# Patient Record
Sex: MALE | Race: BLACK OR AFRICAN AMERICAN
[De-identification: names, ages, dates, MRNs, and addresses within clinical notes are randomized per-mention and may not be internally consistent; named-entity substitution may affect disease eponyms.]

---

## 2023-06-15 ENCOUNTER — RX ONLY (RX ONLY)
Age: 68
End: 2023-06-15

## 2023-06-15 ENCOUNTER — DOCTOR'S OFFICE (OUTPATIENT)
Dept: URBAN - METROPOLITAN AREA CLINIC 125 | Facility: CLINIC | Age: 68
Setting detail: OPHTHALMOLOGY
End: 2023-06-15
Payer: MEDICAID

## 2023-06-15 DIAGNOSIS — H04.123: ICD-10-CM

## 2023-06-15 DIAGNOSIS — H35.363: ICD-10-CM

## 2023-06-15 DIAGNOSIS — H35.373: ICD-10-CM

## 2023-06-15 DIAGNOSIS — H25.043: ICD-10-CM

## 2023-06-15 DIAGNOSIS — H25.013: ICD-10-CM

## 2023-06-15 DIAGNOSIS — E11.9: ICD-10-CM

## 2023-06-15 DIAGNOSIS — H11.153: ICD-10-CM

## 2023-06-15 DIAGNOSIS — H25.13: ICD-10-CM

## 2023-06-15 PROCEDURE — 92004 COMPRE OPH EXAM NEW PT 1/>: CPT | Performed by: OPHTHALMOLOGY

## 2023-06-15 PROCEDURE — 92134 CPTRZ OPH DX IMG PST SGM RTA: CPT | Performed by: OPHTHALMOLOGY

## 2023-06-15 ASSESSMENT — SPHEQUIV_DERIVED
OD_SPHEQUIV: 1.25
OS_SPHEQUIV: 2.5

## 2023-06-15 ASSESSMENT — REFRACTION_AUTOREFRACTION
OS_CYLINDER: -1.50
OS_AXIS: 113
OS_SPHERE: +3.25
OD_CYLINDER: -1.00
OD_SPHERE: +1.75
OD_AXIS: 040

## 2023-06-15 ASSESSMENT — KERATOMETRY
OD_K1POWER_DIOPTERS: 45.00
OS_K1POWER_DIOPTERS: 44.25
OS_K2POWER_DIOPTERS: 45.25
OS_AXISANGLE_DEGREES: 142
OD_K2POWER_DIOPTERS: 46.25
OD_AXISANGLE_DEGREES: 029

## 2023-06-15 ASSESSMENT — REFRACTION_CURRENTRX
OS_SPHERE: +2.75
OD_ADD: +2.25
OS_CYLINDER: -1.50
OS_ADD: +2.25
OD_SPHERE: +2.25
OD_CYLINDER: -1.75
OD_VPRISM_DIRECTION: PROGS
OS_VPRISM_DIRECTION: PROGS
OS_OVR_VA: 20/
OD_OVR_VA: 20/
OS_AXIS: 134
OD_AXIS: 041

## 2023-06-15 ASSESSMENT — DRY EYES - PHYSICIAN NOTES
OD_GENERALCOMMENTS: TRACE PEE
OS_GENERALCOMMENTS: TRACE PEE

## 2023-06-15 ASSESSMENT — AXIALLENGTH_DERIVED
OD_AL: 22.389
OS_AL: 22.2408

## 2023-06-15 ASSESSMENT — CONFRONTATIONAL VISUAL FIELD TEST (CVF)
OD_FINDINGS: FULL
OS_FINDINGS: FULL

## 2023-06-15 ASSESSMENT — VISUAL ACUITY
OS_BCVA: 20/30-1
OD_BCVA: 20/25-2

## 2024-09-03 ENCOUNTER — TELEPHONE (OUTPATIENT)
Age: 69
End: 2024-09-03

## 2024-09-11 ENCOUNTER — OFFICE VISIT (OUTPATIENT)
Dept: OBGYN CLINIC | Facility: MEDICAL CENTER | Age: 69
End: 2024-09-11
Payer: MEDICARE

## 2024-09-11 ENCOUNTER — APPOINTMENT (OUTPATIENT)
Dept: RADIOLOGY | Facility: MEDICAL CENTER | Age: 69
End: 2024-09-11
Payer: MEDICARE

## 2024-09-11 VITALS — DIASTOLIC BLOOD PRESSURE: 81 MMHG | HEART RATE: 64 BPM | SYSTOLIC BLOOD PRESSURE: 134 MMHG

## 2024-09-11 DIAGNOSIS — M54.12 RADICULOPATHY, CERVICAL REGION: ICD-10-CM

## 2024-09-11 DIAGNOSIS — M25.512 LEFT SHOULDER PAIN, UNSPECIFIED CHRONICITY: Primary | ICD-10-CM

## 2024-09-11 DIAGNOSIS — S42.292A OTHER CLOSED DISPLACED FRACTURE OF PROXIMAL END OF LEFT HUMERUS, INITIAL ENCOUNTER: ICD-10-CM

## 2024-09-11 DIAGNOSIS — M25.512 LEFT SHOULDER PAIN, UNSPECIFIED CHRONICITY: ICD-10-CM

## 2024-09-11 PROCEDURE — 73030 X-RAY EXAM OF SHOULDER: CPT

## 2024-09-11 PROCEDURE — 99203 OFFICE O/P NEW LOW 30 MIN: CPT | Performed by: ORTHOPAEDIC SURGERY

## 2024-09-11 RX ORDER — ASPIRIN 81 MG/1
TABLET ORAL
COMMUNITY

## 2024-09-11 RX ORDER — SACUBITRIL AND VALSARTAN 24; 26 MG/1; MG/1
TABLET, FILM COATED ORAL
COMMUNITY
Start: 2024-08-23

## 2024-09-11 RX ORDER — MAGNESIUM OXIDE 400 MG/1
1 TABLET ORAL DAILY
COMMUNITY
Start: 2024-08-31

## 2024-09-11 RX ORDER — APIXABAN 5 MG/1
TABLET, FILM COATED ORAL
COMMUNITY

## 2024-09-11 RX ORDER — DULAGLUTIDE 3 MG/.5ML
INJECTION, SOLUTION SUBCUTANEOUS
COMMUNITY
Start: 2024-08-29

## 2024-09-11 RX ORDER — FLUTICASONE PROPIONATE 50 MCG
SPRAY, SUSPENSION (ML) NASAL
COMMUNITY
Start: 2024-07-02

## 2024-09-11 RX ORDER — CARVEDILOL 12.5 MG/1
12.5 TABLET ORAL 2 TIMES DAILY
COMMUNITY
Start: 2024-07-02

## 2024-09-11 RX ORDER — ERGOCALCIFEROL 1.25 MG/1
50000 CAPSULE, LIQUID FILLED ORAL WEEKLY
COMMUNITY
Start: 2024-07-19

## 2024-09-11 RX ORDER — FUROSEMIDE 80 MG
80 TABLET ORAL DAILY
COMMUNITY
Start: 2024-08-12

## 2024-09-11 RX ORDER — ATORVASTATIN CALCIUM 40 MG/1
TABLET, FILM COATED ORAL
COMMUNITY
Start: 2024-09-10

## 2024-09-11 NOTE — PROGRESS NOTES
Ortho Sports Medicine Shoulder New Patient Visit     Assesment:   69 y.o. male left shoulder proximal humerus fracture 6 weeks out from injury with no physical therapy done and stiffness and malunion DOI 07/29/2024.    Plan:    Conservative treatment:    Ice to shoulder 1-2 times daily, for 20 minutes at a time.  Referral to Dr. Pearl to discuss surgical (RTSA) vs non-surgical treatment with possible manipulation if he remains stiff.  Patient demonstrates shoulder stiffness on exam today.     Imaging:    All imaging from today was reviewed by myself and explained to the patient, including prior xrays, CT, and new xrays obtained during today's visit.      Injection:    No Injection planned at this time.      Surgery:     No surgery is recommended at this point, continue with conservative treatment plan as noted.      Follow up:    No follow-ups on file.        Chief Complaint   Patient presents with    Left Shoulder - Pain       History of Present Illness:    The patient is a 69 y.o., right hand dominant male whose occupation is retired, referred to me by themself, seen in clinic for evaluation and second opinion of right shoulder proximal humerus fracture.. Patient presents with deformity of the shoulder while seated.    The patient has a history of diabetes.  The patient denies a history of thyroid disorder.      Pain is located superior and lateral.  The patient rates the pain as a 3/10.  The pain has been present for 6 weeks.      The patient sustained an injury on 07/29/2024.  The mechanism of injury was a fall onto the lateral aspect of his shoulder. Patient reports he presented to the ED at that time and followed-up with a sports medicine physician Dr. Baltazar Esquivel who followed him with xrays every 2 weeks and recommended a course of PT. PT refused to treat his shoulders due to concern of injuries and referred him back to orthopedics. He reports he has been trying to do exercises on his own.    The pain is  characterized as dull, achy.  The pain is present daily.      The patient has weakness. Patient is unable to make a fist.  The patient has numbness and tingling. Patient has edema into left hand.         Shoulder Surgical History:  None    Past Medical, Social and Family History:  No past medical history on file.  No past surgical history on file.  Allergies   Allergen Reactions    Penicillins Other (See Comments)     Other       No current outpatient medications on file prior to visit.     No current facility-administered medications on file prior to visit.     Social History     Socioeconomic History    Marital status:      Spouse name: Not on file    Number of children: Not on file    Years of education: Not on file    Highest education level: Not on file   Occupational History    Not on file   Tobacco Use    Smoking status: Not on file    Smokeless tobacco: Not on file   Substance and Sexual Activity    Alcohol use: Not on file    Drug use: Not on file    Sexual activity: Not on file   Other Topics Concern    Not on file   Social History Narrative    Not on file     Social Determinants of Health     Financial Resource Strain: Not on file   Food Insecurity: Not on file   Transportation Needs: Not on file   Physical Activity: Not on file   Stress: Not on file   Social Connections: Not on file   Intimate Partner Violence: Not on file   Housing Stability: Not on file         I have reviewed the past medical, surgical, social and family history, medications and allergies as documented in the EMR.    Review of systems: ROS is negative other than that noted in the HPI.  Constitutional: Negative for fatigue and fever.   HENT: Negative for sore throat.    Respiratory: Negative for shortness of breath.    Cardiovascular: Negative for chest pain.   Gastrointestinal: Negative for abdominal pain.   Endocrine: Negative for cold intolerance and heat intolerance.   Genitourinary: Negative for flank pain.    Musculoskeletal: Negative for back pain.   Skin: Negative for rash.   Allergic/Immunologic: Negative for immunocompromised state.   Neurological: Negative for dizziness.   Psychiatric/Behavioral: Negative for agitation.      Physical Exam:    There were no vitals taken for this visit.    General/Constitutional: NAD, well developed, well nourished  HENT: Normocephalic, atraumatic  CV: Intact distal pulses, regular rate  Resp: No respiratory distress or labored breathing  GI: Soft and non-tender   Lymphatic: No lymphadenopathy palpated  Neuro: Alert and Oriented x 3, no focal deficits  Psych: Normal mood, normal affect, normal judgement, normal behavior  Skin: Warm, dry, no rashes, no erythema      Shoulder focused exam:       RIGHT LEFT    Scapula Atrophy Negative Negative     Winging Negative Negative     Protraction Negative Negative    Rotator cuff SS 5/5 4/5     IS 5/5 5/5     SubS 5/5 5/5    ROM     70     ER0 60 30                         TTP: AC Negative Negative     Biceps Negative Negative     Coracoid Negative Negative    Special Tests: O'Briens Negative Positive     Tucker-shear Negative Positive     Cross body Adduction Negative Negative     Speeds  Negative Negative     Raffi's Negative Negative     Whipple Negative Negative       Neer Negative Negative     Erickson Negative Negative    Instability: Apprehension & relocation not tested not tested     Load & shift not tested not tested    Other: Crank Negative Negative                 UE NV Exam: +2 Radial pulses bilaterally  Sensation intact to light touch C5-T1 bilaterally, Radial/median/ulnar nerve motor intact      Bilateral elbow, wrist, and and forearm ROM full, painless with passive ROM, no ttp or crepitance throughout extremities below shoulder joint    Cervical ROM is full without pain, numbness or tingling      Shoulder Imaging    X-rays of the left shoulder were reviewed, which demonstrate healing malunion of left proximal humerus fracture  in nonanatomic position.  I have reviewed the radiology report and agree with their impression.

## 2024-09-23 ENCOUNTER — OFFICE VISIT (OUTPATIENT)
Dept: OBGYN CLINIC | Facility: MEDICAL CENTER | Age: 69
End: 2024-09-23
Payer: MEDICARE

## 2024-09-23 VITALS
SYSTOLIC BLOOD PRESSURE: 145 MMHG | BODY MASS INDEX: 42.66 KG/M2 | HEIGHT: 72 IN | HEART RATE: 58 BPM | WEIGHT: 315 LBS | DIASTOLIC BLOOD PRESSURE: 68 MMHG

## 2024-09-23 DIAGNOSIS — S42.292A OTHER CLOSED DISPLACED FRACTURE OF PROXIMAL END OF LEFT HUMERUS, INITIAL ENCOUNTER: ICD-10-CM

## 2024-09-23 DIAGNOSIS — M25.512 LEFT SHOULDER PAIN, UNSPECIFIED CHRONICITY: ICD-10-CM

## 2024-09-23 PROCEDURE — 99204 OFFICE O/P NEW MOD 45 MIN: CPT | Performed by: ORTHOPAEDIC SURGERY

## 2024-09-23 RX ORDER — TRAMADOL HYDROCHLORIDE 50 MG/1
50 TABLET ORAL
COMMUNITY
Start: 2024-07-29

## 2024-09-23 NOTE — PROGRESS NOTES
Orthopaedic Surgery - Office Note  Blayne Raphael (69 y.o. male)   : 1955   MRN: [id number]  Encounter Date: 2024    Assessment / Plan    Head-split left proximal humerus fracture with callus formation  Activity as tolerated  Begin outpatient PT for shoulder ROM and strengthening  Anti-inflammatories or Tylenol prn pain  XR AT NEXT VISIT:  left shoulder, trauma views  Continue with outpatient PT for 6 weeks before re-evaluation  Complete 3-4 months of outpatient PT before considering a shoulder replacement   Discussed the option of surgical intervention vs conservative treatment   Reviewed imaging on file  Follow-up:  Return in about 6 weeks (around 2024).      Chief Complaint / Date of Onset  Left shoulder pain  Injury Mechanism / Date  Fell directly onto his left shoulder after tripping over a door mat on 24  Surgery / Date  None    History of Present Illness   Blayne Raphael is a 69 y.o. male who presents for consultation for the above listed injury at the request of Ty Delacruz DO. He reports a decrease in overall shoulder pain, but still reports stiffness in all directions. He rates his current pain as a 4/10. He reports being out on his own for the first time this week and was unable to reach his arm out of the car window, and couldn't lift his arm to hit the turn signal. He reports numbness in digits 2-5 at all times and lacks  strength.     Treatment Summary  Medications / Modalities  OTC NSAIDS  Bracing / Immobilization  None  Physical Therapy  Will begin outpatient PT for shoulder ROM and strengthening  Injections  None  Prior Surgeries  None  Other Treatments  None    Employment / Current Status  Unknown    Sport / Organization / Current Status  Active      Review of Systems  Pertinent items are noted in HPI.  All other systems were reviewed and are negative.      Physical Exam  /68   Pulse 58   Ht 6' (1.829 m)   Wt (!) 179 kg (394 lb)   BMI 53.44 kg/m²   Cons: Appears  well.  No apparent distress.  Psych: Alert. Oriented x3.  Mood and affect normal.  Eyes: PERRLA, EOMI  Resp: Normal effort.  No audible wheezing or stridor.  CV: Palpable pulse.  No discernable arrhythmia.    Lymph:  No palpable cervical, axillary, or inguinal lymphadenopathy.  Skin: Warm.  No palpable masses.  No visible lesions.  Neuro: Normal muscle tone.  Normal and symmetric DTR's.       Left Shoulder Exam  Alignment / Posture:  Normal shoulder posture.  Inspection:  No swelling. No erythema. No ecchymosis.  Palpation:   Mild tenderness at bicipital groove.  ROM:  Shoulder FE 50. Shoulder ER 0. Shoulder IR hip.  Strength:  Not tested.  Stability:  Not tested.  Tests: No pertinent positive or negative tests.  Neurovascular:  Sensation intact in Ax/R/M/U nerve distributions. Palpable radial pulse.     Studies Reviewed  XR of left shoulder - Interval healing of proximal humeral fracture      Procedures  No procedures today.    Medical, Surgical, Family, and Social History  The patient's medical history, family history, and social history, were reviewed and updated as appropriate.    No past medical history on file.    No past surgical history on file.    No family history on file.    Social History     Occupational History    Not on file   Tobacco Use    Smoking status: Never    Smokeless tobacco: Never   Vaping Use    Vaping status: Never Used   Substance and Sexual Activity    Alcohol use: Never    Drug use: Never    Sexual activity: Not on file       Allergies   Allergen Reactions    Penicillins Other (See Comments)     Other           Current Outpatient Medications:     aspirin (ECOTRIN LOW STRENGTH) 81 mg EC tablet, Take by mouth, Disp: , Rfl:     atorvastatin (LIPITOR) 40 mg tablet, , Disp: , Rfl:     carvedilol (COREG) 12.5 mg tablet, Take 12.5 mg by mouth 2 (two) times a day, Disp: , Rfl:     Eliquis 5 MG, , Disp: , Rfl:     Entresto 24-26 MG TABS, TAKE 1 TABLET BY MOUTH TWICE A DAY STOP LOSARTAN, Disp: ,  Rfl:     ergocalciferol (VITAMIN D2) 50,000 units, Take 50,000 Units by mouth once a week, Disp: , Rfl:     fluticasone (FLONASE) 50 mcg/act nasal spray, spray 2 sprays into each nostril every day, Disp: , Rfl:     furosemide (LASIX) 80 mg tablet, Take 80 mg by mouth daily, Disp: , Rfl:     magnesium oxide (MAG-OX) 400 mg tablet, Take 1 tablet by mouth daily, Disp: , Rfl:     metFORMIN (GLUCOPHAGE) 1000 MG tablet, Take by mouth, Disp: , Rfl:     Trulicity 3 MG/0.5ML injection, INJECT 1 PEN SUBCUTANEOUSLY EVERY WEEK, Disp: , Rfl:     traMADol (ULTRAM) 50 mg tablet, 50 mg (Patient not taking: Reported on 9/23/2024), Disp: , Rfl:       Moises Anderson    Scribe Attestation      I,:  Moises Anderson am acting as a scribe while in the presence of the attending physician.:       I,:  Bandar Pearl MD personally performed the services described in this documentation    as scribed in my presence.:

## 2024-09-25 ENCOUNTER — TELEPHONE (OUTPATIENT)
Dept: OBGYN CLINIC | Facility: HOSPITAL | Age: 69
End: 2024-09-25

## 2024-09-25 DIAGNOSIS — S42.292A OTHER CLOSED DISPLACED FRACTURE OF PROXIMAL END OF LEFT HUMERUS, INITIAL ENCOUNTER: Primary | ICD-10-CM

## 2024-09-25 NOTE — TELEPHONE ENCOUNTER
Caller: Blayne (Patient)    Doctor: Ryanne    Reason for call: Patient is going outside of [facility] for PT. Asking that his script be faxed. Cannot be sent through [facility].    [facility]- Att: Ester Fax# [phone]    Call back#: [phone]

## 2024-11-04 ENCOUNTER — OFFICE VISIT (OUTPATIENT)
Dept: OBGYN CLINIC | Facility: MEDICAL CENTER | Age: 69
End: 2024-11-04
Payer: MEDICARE

## 2024-11-04 ENCOUNTER — APPOINTMENT (OUTPATIENT)
Dept: RADIOLOGY | Facility: MEDICAL CENTER | Age: 69
End: 2024-11-04
Payer: MEDICARE

## 2024-11-04 VITALS — HEIGHT: 72 IN | WEIGHT: 315 LBS | BODY MASS INDEX: 42.66 KG/M2

## 2024-11-04 DIAGNOSIS — S42.292A OTHER CLOSED DISPLACED FRACTURE OF PROXIMAL END OF LEFT HUMERUS, INITIAL ENCOUNTER: ICD-10-CM

## 2024-11-04 DIAGNOSIS — S42.292A OTHER CLOSED DISPLACED FRACTURE OF PROXIMAL END OF LEFT HUMERUS, INITIAL ENCOUNTER: Primary | ICD-10-CM

## 2024-11-04 PROCEDURE — 73030 X-RAY EXAM OF SHOULDER: CPT

## 2024-11-04 PROCEDURE — 99213 OFFICE O/P EST LOW 20 MIN: CPT | Performed by: ORTHOPAEDIC SURGERY

## 2024-11-04 NOTE — PROGRESS NOTES
Orthopaedic Surgery - Office Note  Blayne Raphael (69 y.o. male)   : 1955   MRN: [id number]  Encounter Date: 2024    Assessment / Plan  Head-split left proximal humerus fracture, with malunion    We discussed that the fracture healed and the alignment is not something we typically except however, if he feels that he can manage with the motion he has that he can continue with his HEP and no further intervention is needed  I do think finding a different therapist, who may push him more could be very beneficial and he could see improvements with motion and pain   We again discussed that if he fails to improve over the next few months and feels that the motion and pain levels are the same, we can consider surgical intervention  Patient is diabetic and does have A-Fib, he is being monitor by a cardiologist for the A-fib  Encouraged him to continue with HEP   Ordered and reviewed XR during the visit   Follow-up:  Return in about 8 weeks (around 2024) for follow up with Dr. Pearl.      Chief Complaint / Date of Onset  Left shoulder pain  Injury Mechanism / Date  Fell directly onto his left shoulder after tripping over a door mat on 24  Surgery / Date  None    History of Present Illness   Blayne Raphael is a 69 y.o. male who presents for 3 months post injury. Today he states he completed 2 weeks of PT and notes his progress plateau and the therapist suggested discharge to [facility]. He has been complaint with this. He does still feel that his motion is limited which requires him to use modification for ADL's. He does feel that he has a constant level of pain with movement and does have an associated popping with movement. He is present today with his daughter and there is a concern about him undergoing surgery but do want optimal outcomes for her dad.     Patient does have A-fib, he is diabetic as well     Treatment Summary  Medications / Modalities  OTC NSAIDS  Bracing / Immobilization  None  Physical  Therapy  Completed 2 weeks of PT in October with no further improvement  Discharged to [facility] an does do his exercises daily   Injections  None  Prior Surgeries  None  Other Treatments  None     Employment / Current Status  Unknown     Sport / Organization / Current Status  Active      Review of Systems  Pertinent items are noted in HPI.  All other systems were reviewed and are negative.      Physical Exam  Ht 6' (1.829 m)   Wt (!) 179 kg (394 lb)   BMI 53.44 kg/m²   Cons: Appears well.  No apparent distress.  Psych: Alert. Oriented x3.  Mood and affect normal.  Eyes: PERRLA, EOMI  Resp: Normal effort.  No audible wheezing or stridor.  CV: Palpable pulse.  No discernable arrhythmia.  No LE edema.  Lymph:  No palpable cervical, axillary, or inguinal lymphadenopathy.  Skin: Warm.  No palpable masses.  No visible lesions.  Neuro: Normal muscle tone.  Normal and symmetric DTR's.     Left Shoulder Exam  Alignment / Posture:  Normal shoulder posture.  Inspection:  No swelling. No ecchymosis.  Palpation:   mild shoulder  tenderness. No effusion.  ROM:  Shoulder FE 85/90. Shoulder ER 10/15. Shoulder IR hip. Unable to reach back of head  Strength:  Supraspinatus 4-/5. Infraspinatus 4-/5. Subscapularis 5/5.  Stability:  Not tested.  Tests:  Not tested  Neurovascular:  Sensation intact in Ax/R/M/U nerve distributions. 2+ radial pulse.       Studies Reviewed  I have personally reviewed pertinent films in PACS.  XR of left shoulder - images from 11/04/2024 showing healing of proximal humerus fracture with head split        Procedures  No procedures today.    Medical, Surgical, Family, and Social History  The patient's medical history, family history, and social history, were reviewed and updated as appropriate.    History reviewed. No pertinent past medical history.    History reviewed. No pertinent surgical history.    History reviewed. No pertinent family history.    Social History     Occupational History    Not on file    Tobacco Use    Smoking status: Never    Smokeless tobacco: Never   Vaping Use    Vaping status: Never Used   Substance and Sexual Activity    Alcohol use: Never    Drug use: Never    Sexual activity: Not on file       Allergies   Allergen Reactions    Penicillins Other (See Comments)     Other           Current Outpatient Medications:     aspirin (ECOTRIN LOW STRENGTH) 81 mg EC tablet, Take by mouth, Disp: , Rfl:     atorvastatin (LIPITOR) 40 mg tablet, , Disp: , Rfl:     carvedilol (COREG) 12.5 mg tablet, Take 12.5 mg by mouth 2 (two) times a day, Disp: , Rfl:     Eliquis 5 MG, , Disp: , Rfl:     Entresto 24-26 MG TABS, TAKE 1 TABLET BY MOUTH TWICE A DAY STOP LOSARTAN, Disp: , Rfl:     ergocalciferol (VITAMIN D2) 50,000 units, Take 50,000 Units by mouth once a week, Disp: , Rfl:     fluticasone (FLONASE) 50 mcg/act nasal spray, spray 2 sprays into each nostril every day, Disp: , Rfl:     furosemide (LASIX) 80 mg tablet, Take 80 mg by mouth daily, Disp: , Rfl:     magnesium oxide (MAG-OX) 400 mg tablet, Take 1 tablet by mouth daily, Disp: , Rfl:     metFORMIN (GLUCOPHAGE) 1000 MG tablet, Take by mouth, Disp: , Rfl:     Trulicity 3 MG/0.5ML injection, INJECT 1 PEN SUBCUTANEOUSLY EVERY WEEK, Disp: , Rfl:     traMADol (ULTRAM) 50 mg tablet, 50 mg (Patient not taking: Reported on 9/23/2024), Disp: , Rfl:       Yuni Jasso    Scribe Attestation      I,:   am acting as a scribe while in the presence of the attending physician.:       I,:   personally performed the services described in this documentation    as scribed in my presence.: